# Patient Record
Sex: MALE | Race: WHITE | NOT HISPANIC OR LATINO | Employment: UNEMPLOYED | URBAN - METROPOLITAN AREA
[De-identification: names, ages, dates, MRNs, and addresses within clinical notes are randomized per-mention and may not be internally consistent; named-entity substitution may affect disease eponyms.]

---

## 2018-09-05 ENCOUNTER — HOSPITAL ENCOUNTER (EMERGENCY)
Facility: HOSPITAL | Age: 38
Discharge: HOME | End: 2018-09-05
Attending: EMERGENCY MEDICINE | Admitting: EMERGENCY MEDICINE
Payer: COMMERCIAL

## 2018-09-05 VITALS
HEART RATE: 74 BPM | RESPIRATION RATE: 18 BRPM | SYSTOLIC BLOOD PRESSURE: 132 MMHG | TEMPERATURE: 97.3 F | DIASTOLIC BLOOD PRESSURE: 67 MMHG | OXYGEN SATURATION: 97 %

## 2018-09-05 DIAGNOSIS — T40.601A OPIATE OVERDOSE, ACCIDENTAL OR UNINTENTIONAL, INITIAL ENCOUNTER (HCC): Primary | ICD-10-CM

## 2018-09-05 PROCEDURE — 93005 ELECTROCARDIOGRAM TRACING: CPT

## 2018-09-05 PROCEDURE — 99285 EMERGENCY DEPT VISIT HI MDM: CPT

## 2018-09-05 NOTE — DISCHARGE INSTRUCTIONS
Opioid Overdose   WHAT YOU NEED TO KNOW:   Opioids are prescription medicines used to treat pain  Some examples include morphine, codeine, methadone, oxycodone, and hydrocodone  An overdose can occur if you take more than the recommended amount  It can also occur if you take opioids with alcohol or certain medicines that can cause harm if taken together  An overdose can also occur if you take an opioid that was prescribed for someone else  Learn to take these medicines safely  An opioid overdose can be life-threatening  DISCHARGE INSTRUCTIONS:   Call 911 for any of the following:   · You have trouble staying awake and your breathing is slow or shallow  Return to the emergency department if:   · Your speech is slurred, or you are confused  · You are dizzy or stumble when you walk  · You are extremely drowsy, or you have trouble staying awake or speaking  · Your body is limp  · You have pale or clammy skin  · You have blue fingernails or lips  · Your heartbeat is slower than normal   Contact your healthcare provider if:   · You have questions or concerns about your condition or care  Follow up with your healthcare provider as directed:  Write down your questions so you remember to ask them during your visits  Prevent opioid overdose:   · Take your medicine exactly as directed  Do not take more of the recommended amount of opioids each time you take it  Do not take opioids more often than recommended  If you use a pain patch, be sure to remove the old patch before you place a new one  · Do not take opioids that belong to someone else  The amount of opioids that person takes may not be right for you  · Do not mix opioids with alcohol, sleeping pills, or street drugs  The combination of these substances can cause an overdose  · Learn about the signs of an overdose  so you know how to respond  Tell others about these symptoms so they will know what to do if needed   Talk to your healthcare provider about naloxone  In some states, you may be able to keep naloxone at home in case of an overdose  Your family and friends can also be trained on how to give it to you if needed  · Keep opioids out of the reach of children  Store opioids in a locked cabinet or in a location that children cannot get to  Ask your healthcare provider how to dispose of any unused opioid medicines  Counseling: Your healthcare provider may recommend that you see a counselor if you are abusing opioids  © 2017 [employer] Information is for End User's use only and may not be sold, redistributed or otherwise used for commercial purposes  All illustrations and images included in CareNotes® are the copyrighted property of [employer]  or [employer]ar Gomez  The above information is an  only  It is not intended as medical advice for individual conditions or treatments  Talk to your doctor, nurse or pharmacist before following any medical regimen to see if it is safe and effective for you

## 2018-09-05 NOTE — ED NOTES
9/5/18 @ 0900:  OGREGORY  arrived without being called; she states that she received text message; she met with patient and he declined any services    Ni Yarbrough MS

## 2018-09-05 NOTE — ED NOTES
Pt sitting in chair in room drinking water from sink, states he felt dehydrated & just needed some water  AO x 3, ambulatory in room, cooperative at this time        Holly Earl RN  09/05/18 8653

## 2018-09-05 NOTE — ED NOTES
Pt stated that he is overdosed and it was accidental overdose as per pt        Enzo Rudolph, JOSAFAT  09/05/18 102

## 2018-09-05 NOTE — ED PROVIDER NOTES
History  Chief Complaint   Patient presents with    Overdose - Accidental     patient did unknown amount of heroin, first time in 6 months  4mg nacan nasal in the field  Patient refused IV and blood sugar in the field  Arrives awake, alert, oriented  Patient has an old history of IVDA  He was recently released from [facility]-way and used a small amount heroin today for the 1st time in 6 months  The patient was found unresponsive this morning, and medics administered 4 mg of intranasal Narcan with response  Patient arrives awake alert complaining of a headache  He states this is a wake-up call            None       Past Medical History:   Diagnosis Date    Asthma        Past Surgical History:   Procedure Laterality Date    KNEE SURGERY         History reviewed  No pertinent family history  I have reviewed and agree with the history as documented  Social History   Substance Use Topics    Smoking status: Never Smoker    Smokeless tobacco: Never Used    Alcohol use No        Review of Systems   Constitutional: Negative for chills and fever  HENT: Negative for sore throat  Respiratory: Negative for shortness of breath  Cardiovascular: Negative for chest pain  Gastrointestinal: Negative for abdominal pain and vomiting  Genitourinary: Negative for dysuria  Musculoskeletal: Negative for back pain  Skin: Negative for rash  Neurological: Positive for headaches  Negative for seizures  All other systems reviewed and are negative  Physical Exam  Physical Exam   Constitutional: He is oriented to person, place, and time  He appears well-developed and well-nourished  HENT:   Head: Normocephalic and atraumatic  Mouth/Throat: Oropharynx is clear and moist    Eyes: Conjunctivae are normal    Neck: Normal range of motion  Cardiovascular: Normal rate, regular rhythm and normal heart sounds  Pulmonary/Chest: Effort normal and breath sounds normal    Abdominal: Soft   Bowel sounds are normal  Musculoskeletal: Normal range of motion  Neurological: He is alert and oriented to person, place, and time  Skin: Skin is warm and dry  Old and fresh track marks are present in bilateral upper extremities   Psychiatric: He has a normal mood and affect  His behavior is normal    Nursing note and vitals reviewed  Vital Signs  ED Triage Vitals [09/05/18 0905]   Temperature Pulse Respirations Blood Pressure SpO2   98 °F (36 7 °C) 87 18 142/90 95 %      Temp Source Heart Rate Source Patient Position - Orthostatic VS BP Location FiO2 (%)   Oral Monitor Lying Left arm --      Pain Score       --           Vitals:    09/05/18 0905   BP: 142/90   Pulse: 87   Patient Position - Orthostatic VS: Lying       Visual Acuity      ED Medications  Medications    EMS REPLENISHMENT MED ( Does not apply Given to EMS 9/5/18 0911)       Diagnostic Studies  Results Reviewed     None                 No orders to display              Procedures  Procedures       Phone Contacts  ED Phone Contact    ED Course                               MDM  Number of Diagnoses or Management Options  Diagnosis management comments: Patient states he use significantly less heroin then a bundle and is surprised of the potency  Patient may have synthetic opioids in his system    CritCare Time    Disposition  Final diagnoses:   Opiate overdose, accidental or unintentional, initial encounter     Time reflects when diagnosis was documented in both MDM as applicable and the Disposition within this note     Time User Action Codes Description Comment    9/5/2018 10:11 AM Leslie RODRIGEZ Add [T45 [id number]] Opiate overdose, accidental or unintentional, initial encounter       ED Disposition     ED Disposition Condition Comment    [facility]      Condition at discharge: Stable        Follow-up Information     Follow up With Specialties Details [facility] [address]  In 1 day  [phone]            Patient's Medications    No medications on file     No discharge procedures on file      ED Provider  Electronically Signed by           Albert Ramírez MD  09/05/18 1854

## 2018-09-06 LAB
ATRIAL RATE: 84 BPM
P AXIS: 23 DEGREES
PR INTERVAL: 144 MS
QRS AXIS: 72 DEGREES
QRSD INTERVAL: 92 MS
QT INTERVAL: 376 MS
QTC INTERVAL: 444 MS
T WAVE AXIS: 30 DEGREES
VENTRICULAR RATE: 84 BPM

## 2018-09-06 PROCEDURE — 93010 ELECTROCARDIOGRAM REPORT: CPT | Performed by: INTERNAL MEDICINE
